# Patient Record
Sex: FEMALE | Race: WHITE | URBAN - METROPOLITAN AREA
[De-identification: names, ages, dates, MRNs, and addresses within clinical notes are randomized per-mention and may not be internally consistent; named-entity substitution may affect disease eponyms.]

---

## 2020-06-09 ENCOUNTER — OFFICE VISIT (OUTPATIENT)
Dept: URBAN - METROPOLITAN AREA TELEHEALTH 2 | Facility: TELEHEALTH | Age: 50
End: 2020-06-09
Payer: MEDICARE

## 2020-06-09 DIAGNOSIS — R10.84 GENERALIZED ABDOMINAL PAIN: ICD-10-CM

## 2020-06-09 DIAGNOSIS — R19.7 DIARRHEA, UNSPECIFIED TYPE: ICD-10-CM

## 2020-06-09 DIAGNOSIS — K62.5 RECTAL BLEEDING: ICD-10-CM

## 2020-06-09 PROBLEM — 12063002: Status: ACTIVE | Noted: 2020-06-09

## 2020-06-09 PROBLEM — 102614006: Status: ACTIVE | Noted: 2020-06-09

## 2020-06-09 PROCEDURE — G8417 CALC BMI ABV UP PARAM F/U: HCPCS | Performed by: INTERNAL MEDICINE

## 2020-06-09 PROCEDURE — 3017F COLORECTAL CA SCREEN DOC REV: CPT | Performed by: INTERNAL MEDICINE

## 2020-06-09 PROCEDURE — 99213 OFFICE O/P EST LOW 20 MIN: CPT | Performed by: INTERNAL MEDICINE

## 2020-06-09 PROCEDURE — 1036F TOBACCO NON-USER: CPT | Performed by: INTERNAL MEDICINE

## 2020-06-09 RX ORDER — DICYCLOMINE HYDROCHLORIDE 10 MG/1
1 TABLET CAPSULE ORAL THREE TIMES A DAY
Qty: 60 | Refills: 1 | OUTPATIENT
Start: 2020-06-09 | End: 2020-08-08

## 2020-06-09 RX ORDER — PANCRELIPASE LIPASE, PANCRELIPASE PROTEASE, PANCRELIPASE AMYLASE 40000; 126000; 168000 [USP'U]/1; [USP'U]/1; [USP'U]/1
AS DIRECTED CAPSULE, DELAYED RELEASE ORAL
Qty: 900 | Refills: 1 | OUTPATIENT
Start: 2020-06-09 | End: 2020-12-05

## 2020-06-10 ENCOUNTER — LAB OUTSIDE AN ENCOUNTER (OUTPATIENT)
Dept: URBAN - METROPOLITAN AREA CLINIC 80 | Facility: CLINIC | Age: 50
End: 2020-06-10

## 2020-06-11 LAB
A/G RATIO: 2.1
ALBUMIN: 4.9
ALKALINE PHOSPHATASE: 107
ALT (SGPT): 15
AST (SGOT): 21
BASO (ABSOLUTE): 0
BASOS: 0
BILIRUBIN, TOTAL: 0.8
BUN/CREATININE RATIO: 9
BUN: 9
C-REACTIVE PROTEIN, QUANT: 2
CALCIUM: 9.1
CARBON DIOXIDE, TOTAL: 19
CHLORIDE: 99
CREATININE: 1.04
EGFR IF AFRICN AM: 72
EGFR IF NONAFRICN AM: 63
EOS (ABSOLUTE): 0
EOS: 0
GLOBULIN, TOTAL: 2.3
GLUCOSE: 142
HEMATOCRIT: 41.3
HEMATOLOGY COMMENTS:: (no result)
HEMOGLOBIN: 13.6
IMMATURE CELLS: (no result)
IMMATURE GRANS (ABS): 0
IMMATURE GRANULOCYTES: 0
LYMPHS (ABSOLUTE): 1.8
LYMPHS: 21
MCH: 31
MCHC: 32.9
MCV: 94
MONOCYTES(ABSOLUTE): 0.5
MONOCYTES: 6
NEUTROPHILS (ABSOLUTE): 6.3
NEUTROPHILS: 73
NRBC: (no result)
PLATELETS: 305
POTASSIUM: 4
PROTEIN, TOTAL: 7.2
RBC: 4.39
RDW: 14.6
SODIUM: 139
WBC: 8.6

## 2020-07-06 ENCOUNTER — TELEPHONE ENCOUNTER (OUTPATIENT)
Dept: URBAN - METROPOLITAN AREA CLINIC 92 | Facility: CLINIC | Age: 50
End: 2020-07-06

## 2020-10-14 ENCOUNTER — OFFICE VISIT (OUTPATIENT)
Dept: URBAN - METROPOLITAN AREA CLINIC 80 | Facility: CLINIC | Age: 50
End: 2020-10-14

## 2020-10-28 ENCOUNTER — DASHBOARD ENCOUNTERS (OUTPATIENT)
Age: 50
End: 2020-10-28

## 2020-10-28 ENCOUNTER — LAB OUTSIDE AN ENCOUNTER (OUTPATIENT)
Dept: URBAN - METROPOLITAN AREA CLINIC 80 | Facility: CLINIC | Age: 50
End: 2020-10-28

## 2020-10-28 ENCOUNTER — WEB ENCOUNTER (OUTPATIENT)
Dept: URBAN - METROPOLITAN AREA CLINIC 80 | Facility: CLINIC | Age: 50
End: 2020-10-28

## 2020-10-28 ENCOUNTER — OFFICE VISIT (OUTPATIENT)
Dept: URBAN - METROPOLITAN AREA CLINIC 80 | Facility: CLINIC | Age: 50
End: 2020-10-28
Payer: MEDICARE

## 2020-10-28 DIAGNOSIS — K58.0 IRRITABLE BOWEL SYNDROME WITH DIARRHEA: ICD-10-CM

## 2020-10-28 PROBLEM — 197125005: Status: ACTIVE | Noted: 2020-10-28

## 2020-10-28 PROCEDURE — G8482 FLU IMMUNIZE ORDER/ADMIN: HCPCS | Performed by: PHYSICIAN ASSISTANT

## 2020-10-28 PROCEDURE — G8417 CALC BMI ABV UP PARAM F/U: HCPCS | Performed by: PHYSICIAN ASSISTANT

## 2020-10-28 PROCEDURE — G8427 DOCREV CUR MEDS BY ELIG CLIN: HCPCS | Performed by: PHYSICIAN ASSISTANT

## 2020-10-28 PROCEDURE — 99213 OFFICE O/P EST LOW 20 MIN: CPT | Performed by: PHYSICIAN ASSISTANT

## 2020-10-28 PROCEDURE — 3017F COLORECTAL CA SCREEN DOC REV: CPT | Performed by: PHYSICIAN ASSISTANT

## 2020-10-28 PROCEDURE — G9903 PT SCRN TBCO ID AS NON USER: HCPCS | Performed by: PHYSICIAN ASSISTANT

## 2020-10-28 RX ORDER — COLESEVELAM HYDROCHLORIDE 625 MG/1
3 TABLETS WITH MEALS TABLET, FILM COATED ORAL TWICE A DAY
Qty: 180 TABLET | Refills: 1 | OUTPATIENT
Start: 2020-10-28

## 2020-10-28 RX ORDER — SODIUM, POTASSIUM,MAG SULFATES 17.5-3.13G
354ML SOLUTION, RECONSTITUTED, ORAL ORAL
Qty: 354 MILLILITER | Refills: 0 | OUTPATIENT
Start: 2020-10-28 | End: 2020-10-29

## 2020-10-28 RX ORDER — NORTRIPTYLINE HYDROCHLORIDE 10 MG/1
1 CAPSULE CAPSULE ORAL ONCE A DAY
Qty: 30 | OUTPATIENT
Start: 2020-10-28

## 2020-10-28 RX ORDER — DESVENLAFAXINE SUCCINATE 25 MG/1
2 TABLETS TABLET, EXTENDED RELEASE ORAL ONCE A DAY
Status: ACTIVE | COMMUNITY

## 2020-10-28 RX ORDER — PANCRELIPASE LIPASE, PANCRELIPASE PROTEASE, PANCRELIPASE AMYLASE 40000; 126000; 168000 [USP'U]/1; [USP'U]/1; [USP'U]/1
AS DIRECTED CAPSULE, DELAYED RELEASE ORAL
Qty: 900 | Refills: 1 | Status: ACTIVE | COMMUNITY
Start: 2020-06-09 | End: 2020-12-05

## 2020-10-28 NOTE — HPI-TODAY'S VISIT:
Patient has lost about 35 pounds in past 5-6 months - IBS and decreased appetite Her IBS has been acting up Diarrhea - yesterday 5 BM in 30 min and then somedays none Increased cramping No BRBPR or melena Dicyclomine 10mg tid, Creon 3 with meals and 1 with snacks  Not taking Imodium much labs in June - cbc, cmp, crp were all wnl She has had to miss work secondary to the diarrhea She does not have her gallbladder - had tried Questran with no help Last colon 1 year ago and recommended repeat in 1 year since prep was not good She has tried Dicyclomine, Questran, Zenpep, Enteragam Has had VIP, Chromogranin A, urine 5HIAA done as well - all were normal